# Patient Record
Sex: FEMALE | Race: WHITE | NOT HISPANIC OR LATINO | URBAN - METROPOLITAN AREA
[De-identification: names, ages, dates, MRNs, and addresses within clinical notes are randomized per-mention and may not be internally consistent; named-entity substitution may affect disease eponyms.]

---

## 2023-11-19 ENCOUNTER — OFFICE VISIT (OUTPATIENT)
Dept: URGENT CARE | Facility: CLINIC | Age: 63
End: 2023-11-19
Payer: COMMERCIAL

## 2023-11-19 VITALS
SYSTOLIC BLOOD PRESSURE: 93 MMHG | HEART RATE: 75 BPM | OXYGEN SATURATION: 98 % | WEIGHT: 149 LBS | DIASTOLIC BLOOD PRESSURE: 63 MMHG | TEMPERATURE: 98.1 F | RESPIRATION RATE: 14 BRPM

## 2023-11-19 DIAGNOSIS — B34.9 VIRAL ILLNESS: Primary | ICD-10-CM

## 2023-11-19 LAB
POC TRIPLEX FLU A-AG: NORMAL
POC TRIPLEX FLU B-AG: NORMAL
POC TRIPLEX SARSCOV-2 AG: NORMAL

## 2023-11-19 PROCEDURE — 99203 OFFICE O/P NEW LOW 30 MIN: CPT | Performed by: PHYSICIAN ASSISTANT

## 2023-11-19 PROCEDURE — 87428 SARSCOV & INF VIR A&B AG IA: CPT | Performed by: PHYSICIAN ASSISTANT

## 2023-11-19 PROCEDURE — 1036F TOBACCO NON-USER: CPT | Performed by: PHYSICIAN ASSISTANT

## 2023-11-19 RX ORDER — AZITHROMYCIN 250 MG/1
TABLET, FILM COATED ORAL
Qty: 6 TABLET | Refills: 0 | Status: SHIPPED | OUTPATIENT
Start: 2023-11-19

## 2023-11-20 PROBLEM — B34.9 VIRAL ILLNESS: Status: ACTIVE | Noted: 2023-11-20

## 2023-11-20 ASSESSMENT — ENCOUNTER SYMPTOMS
SORE THROAT: 1
FEVER: 1

## 2023-11-20 NOTE — PROGRESS NOTES
Subjective   Patient ID: Giselle Kimbrough is a 63 y.o. female.    Patient is a 63-year-old female who complains of fever, throat irritation and neck pain that she has been experiencing for the past 2 to 3 days.  Patient reports no sinus pain or pressure and also denies ear pain.  Patient reports no chills, myalgia or cough.      Fever   Associated symptoms include a sore throat.     The following portions of the chart were reviewed this encounter and updated as appropriate:  Allergies       Review of Systems   Constitutional:  Positive for fever.   HENT:  Positive for sore throat.    All other systems reviewed and are negative.    Objective   Physical Exam  Vitals and nursing note reviewed.   Constitutional:       Appearance: Normal appearance. She is normal weight.   HENT:      Head: Normocephalic and atraumatic.      Right Ear: Tympanic membrane, ear canal and external ear normal.      Left Ear: Tympanic membrane, ear canal and external ear normal.      Nose: Nose normal. No congestion or rhinorrhea.      Mouth/Throat:      Mouth: Mucous membranes are moist.      Pharynx: Oropharynx is clear. No oropharyngeal exudate or posterior oropharyngeal erythema.   Eyes:      Extraocular Movements: Extraocular movements intact.      Conjunctiva/sclera: Conjunctivae normal.      Pupils: Pupils are equal, round, and reactive to light.   Cardiovascular:      Rate and Rhythm: Normal rate and regular rhythm.      Pulses: Normal pulses.      Heart sounds: Normal heart sounds.   Pulmonary:      Effort: Pulmonary effort is normal. No respiratory distress.      Breath sounds: Normal breath sounds. No stridor. No wheezing, rhonchi or rales.   Musculoskeletal:      Cervical back: Normal range of motion and neck supple.   Skin:     General: Skin is warm and dry.      Capillary Refill: Capillary refill takes less than 2 seconds.   Neurological:      General: No focal deficit present.      Mental Status: She is alert and oriented to  person, place, and time.   Psychiatric:         Mood and Affect: Mood normal.         Behavior: Behavior normal.         Thought Content: Thought content normal.         Judgment: Judgment normal.     Assessment/Plan   Physical exam findings as noted above.  Influenza test is negative and SARS-CoV-2 culture is negative.  Patient was provided with a prescription for Zithromax 250 mg and advised to contact her primary care physician for further evaluation if her symptoms do not improve.  Patient verbalizes good understanding of the above instructions.    CLINICAL IMPRESSION:  Viral Illness    Diagnoses and all orders for this visit:  Viral illness  -     [facility]  -     azithromycin (Zithromax) 250 mg tablet; Take 2 tablets (500 mg) on  Day 1,  followed by 1 tablet (250 mg) once daily on Days 2 through 5.

## 2024-01-11 ENCOUNTER — OFFICE VISIT (OUTPATIENT)
Dept: URGENT CARE | Facility: CLINIC | Age: 64
End: 2024-01-11
Payer: COMMERCIAL

## 2024-01-11 VITALS
TEMPERATURE: 102.4 F | SYSTOLIC BLOOD PRESSURE: 113 MMHG | HEART RATE: 95 BPM | WEIGHT: 146 LBS | DIASTOLIC BLOOD PRESSURE: 70 MMHG | RESPIRATION RATE: 28 BRPM | OXYGEN SATURATION: 96 %

## 2024-01-11 DIAGNOSIS — R52 BODY ACHES: Primary | ICD-10-CM

## 2024-01-11 DIAGNOSIS — B34.9 VIRAL ILLNESS: ICD-10-CM

## 2024-01-11 LAB
POC TRIPLEX FLU A-AG: NORMAL
POC TRIPLEX FLU B-AG: NORMAL
POC TRIPLEX SARSCOV-2 AG: NORMAL

## 2024-01-11 PROCEDURE — 1036F TOBACCO NON-USER: CPT | Performed by: PHYSICIAN ASSISTANT

## 2024-01-11 PROCEDURE — 87428 SARSCOV & INF VIR A&B AG IA: CPT | Performed by: PHYSICIAN ASSISTANT

## 2024-01-11 PROCEDURE — 99213 OFFICE O/P EST LOW 20 MIN: CPT | Performed by: PHYSICIAN ASSISTANT

## 2024-01-11 ASSESSMENT — ENCOUNTER SYMPTOMS
CHILLS: 1
MYALGIAS: 1